# Patient Record
Sex: MALE | URBAN - METROPOLITAN AREA
[De-identification: names, ages, dates, MRNs, and addresses within clinical notes are randomized per-mention and may not be internally consistent; named-entity substitution may affect disease eponyms.]

---

## 2019-03-05 ENCOUNTER — APPOINTMENT (RX ONLY)
Dept: URBAN - METROPOLITAN AREA CLINIC 152 | Facility: CLINIC | Age: 39
Setting detail: DERMATOLOGY
End: 2019-03-05

## 2019-03-05 DIAGNOSIS — L85.8 OTHER SPECIFIED EPIDERMAL THICKENING: ICD-10-CM

## 2019-03-05 DIAGNOSIS — B35.1 TINEA UNGUIUM: ICD-10-CM

## 2019-03-05 DIAGNOSIS — D17 BENIGN LIPOMATOUS NEOPLASM: ICD-10-CM

## 2019-03-05 PROBLEM — D17.0 BENIGN LIPOMATOUS NEOPLASM OF SKIN AND SUBCUTANEOUS TISSUE OF HEAD, FACE AND NECK: Status: ACTIVE | Noted: 2019-03-05

## 2019-03-05 PROCEDURE — ? DIAGNOSIS COMMENT

## 2019-03-05 PROCEDURE — ? COUNSELING

## 2019-03-05 PROCEDURE — ? PRESCRIPTION

## 2019-03-05 PROCEDURE — 99203 OFFICE O/P NEW LOW 30 MIN: CPT

## 2019-03-05 RX ORDER — EFINACONAZOLE 100 MG/ML
SOLUTION TOPICAL NIGHTLY
Qty: 1 | Refills: 5 | Status: ERX | COMMUNITY
Start: 2019-03-05

## 2019-03-05 RX ORDER — UREA 500 MG/G
CREAM TOPICAL
Qty: 1 | Refills: 6 | Status: ERX | COMMUNITY
Start: 2019-03-05

## 2019-03-05 RX ADMIN — EFINACONAZOLE: 100 SOLUTION TOPICAL at 00:00

## 2019-03-05 RX ADMIN — UREA: 500 CREAM TOPICAL at 00:00

## 2019-03-05 ASSESSMENT — LOCATION DETAILED DESCRIPTION DERM
LOCATION DETAILED: LEFT SUPERIOR PARIETAL SCALP
LOCATION DETAILED: LEFT KNEE
LOCATION DETAILED: LEFT GREAT TOENAIL
LOCATION DETAILED: RIGHT KNEE
LOCATION DETAILED: RIGHT GREAT TOENAIL

## 2019-03-05 ASSESSMENT — LOCATION SIMPLE DESCRIPTION DERM
LOCATION SIMPLE: RIGHT KNEE
LOCATION SIMPLE: LEFT GREAT TOE
LOCATION SIMPLE: RIGHT GREAT TOE
LOCATION SIMPLE: LEFT KNEE
LOCATION SIMPLE: SCALP

## 2019-03-05 ASSESSMENT — LOCATION ZONE DERM
LOCATION ZONE: SCALP
LOCATION ZONE: TOENAIL
LOCATION ZONE: LEG

## 2019-03-05 NOTE — PROCEDURE: DIAGNOSIS COMMENT
Comment: Discussed treatment with Chandrakant mcnair QHS x 9 months. Will consider oral Lamisil if no improvement in 9 months.
Detail Level: Simple
Comment: Advised pt if not covered then can buy OTC urea cream 40% on [employer]

## 2019-03-05 NOTE — PROCEDURE: MIPS QUALITY
Quality 130: Documentation Of Current Medications In The Medical Record: Current Medications Documented
Quality 131: Pain Assessment And Follow-Up: Pain assessment using a standardized tool is documented as negative, no follow-up plan required
Detail Level: Detailed
Quality 402: Tobacco Use And Help With Quitting Among Adolescents: Patient screened for tobacco and never smoked
Quality 431: Preventive Care And Screening: Unhealthy Alcohol Use - Screening: Patient screened for unhealthy alcohol use using a single question and scores less than 2 times per year
Quality 110: Preventive Care And Screening: Influenza Immunization: Influenza Immunization previously received during influenza season

## 2019-03-07 ENCOUNTER — RX ONLY (OUTPATIENT)
Age: 39
Setting detail: RX ONLY
End: 2019-03-07

## 2019-03-07 RX ORDER — FLUOCINOLONE ACETONIDE, HYDROQUINONE, AND TRETINOIN .1; 40; .5 MG/G; MG/G; MG/G
CREAM TOPICAL
Qty: 1 | Refills: 3 | Status: ERX | COMMUNITY
Start: 2019-03-07

## 2019-04-03 ENCOUNTER — RX ONLY (OUTPATIENT)
Age: 39
Setting detail: RX ONLY
End: 2019-04-03

## 2019-04-03 RX ORDER — UREA 470 MG/G
CREAM TOPICAL
Qty: 1 | Refills: 6 | Status: ERX | COMMUNITY
Start: 2019-04-03